# Patient Record
Sex: FEMALE | URBAN - METROPOLITAN AREA
[De-identification: names, ages, dates, MRNs, and addresses within clinical notes are randomized per-mention and may not be internally consistent; named-entity substitution may affect disease eponyms.]

---

## 2024-02-22 ENCOUNTER — OFFICE VISIT (OUTPATIENT)
Dept: OBSTETRICS AND GYNECOLOGY | Facility: CLINIC | Age: 24
End: 2024-02-22
Payer: COMMERCIAL

## 2024-02-22 VITALS — WEIGHT: 168.7 LBS | SYSTOLIC BLOOD PRESSURE: 126 MMHG | HEART RATE: 76 BPM | DIASTOLIC BLOOD PRESSURE: 86 MMHG

## 2024-02-22 DIAGNOSIS — Z00.00 WELL WOMAN EXAM (NO GYNECOLOGICAL EXAM): Primary | ICD-10-CM

## 2024-02-22 DIAGNOSIS — Z30.41 ENCOUNTER FOR SURVEILLANCE OF CONTRACEPTIVE PILLS: ICD-10-CM

## 2024-02-22 PROCEDURE — 1036F TOBACCO NON-USER: CPT | Performed by: ADVANCED PRACTICE MIDWIFE

## 2024-02-22 PROCEDURE — 99385 PREV VISIT NEW AGE 18-39: CPT | Performed by: ADVANCED PRACTICE MIDWIFE

## 2024-02-22 RX ORDER — LEVETIRACETAM 500 MG/1
500 TABLET ORAL 2 TIMES DAILY
COMMUNITY
Start: 2020-06-30 | End: 2025-01-11

## 2024-02-22 RX ORDER — SPIRONOLACTONE 100 MG/1
TABLET, FILM COATED ORAL
COMMUNITY

## 2024-02-22 ASSESSMENT — ENCOUNTER SYMPTOMS
CONSTITUTIONAL NEGATIVE: 0
ALLERGIC/IMMUNOLOGIC NEGATIVE: 0
GASTROINTESTINAL NEGATIVE: 0
ENDOCRINE NEGATIVE: 0
NEUROLOGICAL NEGATIVE: 0
EYES NEGATIVE: 0
RESPIRATORY NEGATIVE: 0
HEMATOLOGIC/LYMPHATIC NEGATIVE: 0
PSYCHIATRIC NEGATIVE: 0
CARDIOVASCULAR NEGATIVE: 0
MUSCULOSKELETAL NEGATIVE: 0

## 2024-02-22 ASSESSMENT — PAIN SCALES - GENERAL: PAINLEVEL: 0-NO PAIN

## 2024-02-22 NOTE — PROGRESS NOTES
.Subjective   23 y.o. yo nullip presents for well woman exam.   Concerns:  birth control refill, inquired about pap smear routine    Patient's last menstrual period was 02/15/2024.   Periods are regular every 28-30 days, lasting 3 days.   Dysmenorrhea:none.   Cyclic symptoms include none.     Sexual Activity: sexually active, male partners; Patient reports 1 partners in the last 12 months.  Pain with intercourse? No     History of prior STI: none    Current contraception: OCP (estrogen/progesterone)    Last pap: N/A - due for pap now  History of abnormal Pap smear: no  Family history of uterine or ovarian cancer: no  Family history of breast cancer: no  Family history of colon cancer: no  Diet/Exercise: healthy diet, exercise x3 days/week  Tobacco/alcohol/drug use: none    Objective   /86   Pulse 76   Wt 76.5 kg (168 lb 11.2 oz)   LMP 02/15/2024   OBGyn Exam   General: alert and oriented, in no acute distress  Thyroid: thyroid exam: normal to inspection and palpation  Heart: regular rate and rhythm, S1, S2 normal, no murmur, click, rub or gallop  Lungs: clear to auscultation bilaterally  Neuro: age-appropriate affect, behavior and speech, no gross motor deficits, normal gait  Psych: psych exam: alert,oriented, in NAD with a full range of affect, normal behavior and no psychotic features      Assessment/Plan   Problem List Items Addressed This Visit             ICD-10-CM    Well woman exam (no gynecological exam) - Primary Z00.00    Encounter for surveillance of contraceptive pills Z30.41    Relevant Medications    norgestrel-ethinyl estradioL (Low-osgestrel,Cryselle) 0.3-30 mg-mcg tablet    Reviewed recommendation for pap for cervical cancer screening, patient would like to defer at this time, was not prepared for exam, would like to come back  Refill OCPs sent, discussed correct usage and ACHEs  Luz Vargas, FUNMI-CNM, APRN-CNP